# Patient Record
Sex: FEMALE | Race: AMERICAN INDIAN OR ALASKA NATIVE
[De-identification: names, ages, dates, MRNs, and addresses within clinical notes are randomized per-mention and may not be internally consistent; named-entity substitution may affect disease eponyms.]

---

## 2020-10-09 ENCOUNTER — HOSPITAL ENCOUNTER (EMERGENCY)
Dept: HOSPITAL 7 - FB.ED | Age: 20
Discharge: HOME | End: 2020-10-09
Payer: SELF-PAY

## 2020-10-09 DIAGNOSIS — Z79.899: ICD-10-CM

## 2020-10-09 DIAGNOSIS — F41.0: Primary | ICD-10-CM

## 2020-10-09 PROCEDURE — 99283 EMERGENCY DEPT VISIT LOW MDM: CPT

## 2020-10-09 PROCEDURE — 96372 THER/PROPH/DIAG INJ SC/IM: CPT

## 2020-10-09 NOTE — EDM.PDOCBH
ED HPI GENERAL MEDICAL PROBLEM





- General


Stated Complaint: SOB;CHEST PAIN


Time Seen by Provider: 10/09/20 20:55


Source of Information: Reports: Patient


History Limitations: Reports: No Limitations





- History of Present Illness


INITIAL COMMENTS - FREE TEXT/NARRATIVE: 





c/o panic attack





moved to local area 1w ago, not working





h/o panic attacks and anxiety, saw Ramon at [facility] [place] in past week and b

nolan on hydroxyzine 25 mg QID and Zoloft 25 mg/d, refered to counselor and has 

apt in 2w with Raad





lives with father who lives locally and with whom she recently reconciled as 

well as boyfriend and 1.4yo son, no one at home is ill





has been taking Zoloft, takes hydroxyzine usually 1/2 tab BID





no triggers for PAs





watching TV when she started, felt anxious and dizzy, head did not feel right 

altho she would discribe it as a HA, sxs had abated by time she arrived in ED, 

calm here altho head still did not feel quite right





did agree to Toradol 60 mg IM (for head dysphoria) and hydroxyzine 25 mg IM (for

anxiety)





in ED pt did not have sob/palpitations/f/c/d/n/v





- Related Data


                                    Allergies











Allergy/AdvReac Type Severity Reaction Status Date / Time


 


No Known Allergies Allergy   Verified 10/09/20 21:35














ED ROS GENERAL





- Review of Systems


Review Of Systems: See Below


Constitutional: Reports: No Symptoms


HEENT: Reports: No Symptoms


Respiratory: Reports: No Symptoms


Cardiovascular: Reports: No Symptoms


Endocrine: Reports: No Symptoms


GI/Abdominal: Reports: No Symptoms


: Reports: No Symptoms


Musculoskeletal: Reports: No Symptoms


Skin: Reports: No Symptoms


Neurological: Reports: No Symptoms


Psychiatric: Reports: Anxiety


Hematologic/Lymphatic: Reports: No Symptoms


Immunologic: Reports: No Symptoms





ED EXAM, BEHAVIORAL HEALTH





- Physical Exam


Exam: See Below


Exam Limited By: No Limitations


General Appearance: Alert, WD/WN, No Apparent Distress


Eye Exam: Bilateral Eye: EOMI, PERRL


Ears: Normal External Exam, Hearing Grossly Normal


Nose: Normal Inspection, Normal Mucosa, No Blood


Throat/Mouth: Normal Voice, No Airway Compromise


Head: Atraumatic, Normocephalic


Neck: Normal Inspection, Supple, Non-Tender, Full Range of Motion.  No: 

Lymphadenopathy (R), Lymphadenopathy (L)


Respiratory/Chest: No Respiratory Distress, Lungs Clear, Normal Breath Sounds, 

No Accessory Muscle Use, Chest Non-Tender


Cardiovascular: Regular Rate, Rhythm, No Edema, No Gallop, No JVD, No Murmur, No

Rub


GI/Abdominal: Soft, Non-Tender, No Distention


Back Exam: Normal Inspection, Full Range of Motion.  No: CVA Tenderness (R), CVA

Tenderness (L)


Extremities: Normal Inspection, Normal Range of Motion, Non-Tender, No Pedal 

Edema


Neurological: Alert, Normal Mood/Affect, CN II-XII Intact, Normal Cognition, No 

Motor/Sensory Deficits, Oriented x 3


Psychiatric: Alert, Normal Affect, Normal Cognition, Normal Mood, Oriented, 

Other (not visibly anxious here, cooperative, pleasant)


Skin Exam: Warm, Dry, Intact, Normal color, No rash





COURSE, BEHAVIORAL HEALTH COMP





- Course


Orders, Labs, Meds: 


Medications














Discontinued Medications














Generic Name Dose Route Start Last Admin





  Trade Name Bretq  PRN Reason Stop Dose Admin


 


Hydroxyzine HCl  25 mg  10/09/20 21:20  10/09/20 21:43





  Vistaril  IM  10/09/20 21:21  25 mg





  ONETIME ONE   Administration


 


Ketorolac Tromethamine  60 mg  10/09/20 21:20  10/09/20 21:44





  Toradol  IM  10/09/20 21:21  60 mg





  ONETIME ONE   Administration











Re-Assessment/Re-Exam: 





after meds here, pt still felt a little "woozy" in the head, no true pain





denied anxiety at time of d/c, was feeling better





wanted to get home to check on her son and to get some sleep





pt plans to continue Zoloft as scheduled including dose tonight





Departure





- Departure


Time of Disposition: 22:02


Disposition: Home, Self-Care 01


Condition: Good


Clinical Impression: 


 Panic attack








- Discharge Information


*PRESCRIPTION DRUG MONITORING PROGRAM REVIEWED*: Not Applicable


*COPY OF PRESCRIPTION DRUG MONITORING REPORT IN PATIENT CHADD: Not Applicable


Instructions:  Panic Attack, Living With Anxiety


Referrals: 


PCP,None [Primary Care Provider] - 


Additional Instructions: 


You are doing quite well.





Your exam and vital signs are normal.





Your heart and lungs are healthy.





Continue the Zoloft 25 mg 1 tab daily, as you are doing.





Continue the hydroxyzine 25 mg 1 tab 4 times a day.





Get adequate rest.





Eat 3 meals a day.





Maintain fluids.





See your counselor in 2 weeks as scheduled.





See Ramon at the [facility] in 2 weeks as scheduled.





Return to ED if you are feeling worse.

## 2021-04-01 ENCOUNTER — HOSPITAL ENCOUNTER (EMERGENCY)
Dept: HOSPITAL 7 - FB.ED | Age: 21
Discharge: HOME | End: 2021-04-01
Payer: SELF-PAY

## 2021-04-01 DIAGNOSIS — O99.891: Primary | ICD-10-CM

## 2021-04-01 DIAGNOSIS — R07.89: ICD-10-CM

## 2021-04-01 DIAGNOSIS — Z3A.27: ICD-10-CM

## 2021-04-01 NOTE — EDM.PDOC
ED HPI GENERAL MEDICAL PROBLEM





- General


Chief Complaint: General


Stated Complaint: CHEST PAIN


Time Seen by Provider: 21 01:35


Source of Information: Reports: Patient


History Limitations: Reports: No Limitations





- History of Present Illness


INITIAL COMMENTS - FREE TEXT/NARRATIVE: 





Patient presented to the ED because of bilateral shoulder /arm pain radiating 

towards the mid chest. The pain is sharp,3/10. there is no nausea/vomiting or 

dyspnea. She just want to be checjed because she is 27 wks age of gestation.





- Related Data


                                    Allergies











Allergy/AdvReac Type Severity Reaction Status Date / Time


 


No Known Allergies Allergy   Verified 21 02:04











Home Meds: 


                                    Home Meds





Pnv No.95/Ferrous Fum/Folic AC [Prenatal Caplet] 1 tab PO DAILY 21 

[History]











Past Medical History


OB/GYN History: Reports: Pregnancy


Other OB/GYN History: 


Psychiatric History: Reports: Anxiety, Depression





Social & Family History





- Tobacco Use


Tobacco Use Status *Q: Never Tobacco User





- Caffeine Use


Caffeine Use: Reports: Soda





- Recreational Drug Use


Recreational Drug Use: No





ED ROS GENERAL





- Review of Systems


Review Of Systems: See Below


Constitutional: Reports: No Symptoms


HEENT: Reports: No Symptoms


Respiratory: Reports: No Symptoms


Cardiovascular: Reports: Chest Pain


Endocrine: Reports: No Symptoms


GI/Abdominal: Reports: No Symptoms


: Reports: No Symptoms


Musculoskeletal: Reports: No Symptoms


Skin: Reports: No Symptoms


Neurological: Reports: No Symptoms


Psychiatric: Reports: No Symptoms


Hematologic/Lymphatic: Reports: No Symptoms





ED EXAM, GENERAL





- Physical Exam


Exam: See Below


Exam Limited By: No Limitations


General Appearance: Alert, No Apparent Distress


Ears: Normal External Exam, Normal Canal


Nose: Normal Inspection, Normal Mucosa, No Blood


Throat/Mouth: Normal Inspection, Normal Lips


Head: Atraumatic, Normocephalic


Neck: Normal Inspection, Supple, Non-Tender


Respiratory/Chest: No Respiratory Distress, Lungs Clear, Normal Breath Sounds


Cardiovascular: Normal Peripheral Pulses, Regular Rate, Rhythm, No Edema, No 

Gallop, No JVD, No Murmur


GI/Abdominal: Normal Bowel Sounds, Soft, Non-Tender, No Organomegaly, No 

Distention, No Abnormal Bruit


Back Exam: Normal Inspection, Full Range of Motion


Extremities: Normal Inspection, Normal Range of Motion, Non-Tender, No Pedal 

Edema, Normal Capillary Refill


Neurological: Alert, Oriented, CN II-XII Intact, Normal Cognition


Psychiatric: Normal Affect, Normal Mood


Skin Exam: Warm


  ** #1 Interpretation


EKG Date: 21


Time: 01:42


Rhythm: NSR


Rate (Beats/Min): 67


Axis: Normal


P-Wave: Present


QRS: Normal


ST-T: Normal


QT: Normal


Comparison: NA - No Prior EKG (NSR)





Course





- Vital Signs


Text/Narrative:: 





Labd/EKG result was reviewed and discussed with patient


Last Recorded V/S: 


                                Last Vital Signs











Temp  36.7 C   21 03:35


 


Pulse  75   21 03:35


 


Resp  16   21 03:35


 


BP  112/64   21 03:35


 


Pulse Ox  100   21 03:35














- Orders/Labs/Meds


Orders: 


                               Active Orders 24 hr











 Category Date Time Status


 


 EKG 12 Lead [EK] Routine Ther  21 01:42 Ordered











Labs: 


                                Laboratory Tests











  21 Range/Units





  02:00 02:00 02:10 


 


WBC  9.1    (3.0-10.3)  x10-3/uL


 


RBC  3.62    (3.60-5.20)  x10(6)uL


 


Hgb  10.7 L    (11.4-15.5)  g/dL


 


Hct  31.7 L    (34.2-48.2)  %


 


MCV  87.4    (76.7-100.5)  fL


 


MCH  29.6    (23.9-33.9)  pg


 


MCHC  33.8    (31.9-34.8)  g/dL


 


RDW  12.3    (12.3-16.5)  %


 


Plt Count  206    (151-488)  x10(3)uL


 


MPV  9.2    (7.1-12.4)  fL


 


Neut % (Auto)  72.5    (30.8-76.2)  %


 


Lymph % (Auto)  20.9    (18.4-52.1)  %


 


Mono % (Auto)  5.5    (4.4-15.7)  %


 


Eos % (Auto)  0.9    (0.6-8.1)  %


 


Baso % (Auto)  0.2    (0.2-1.5)  %


 


Neut # (Auto)  6.6 H    (1.5-6.3)  x10-3/uL


 


Lymph # (Auto)  1.9    (1.0-4.4)  x10-3/uL


 


Mono # (Auto)  0.5    (0.3-1.0)  x10-3/uL


 


Eos # (Auto)  0.1    (0.0-0.8)  x10-3/uL


 


Baso # (Auto)  0.0    (0.0-0.1)  x10-3/uL


 


D-Dimer, Quantitative    0.53  (0.0-0.59)  mg/LFEU


 


Sodium   138   (135-145)  mmol/L


 


Potassium   3.5   (3.5-5.3)  mmol/L


 


Chloride   102   (100-110)  mmol/L


 


Carbon Dioxide   24   (21-32)  mmol/L


 


BUN   3 L   (7-18)  mg/dL


 


Creatinine   0.5 L   (0.55-1.02)  mg/dL


 


Est Cr Clr Drug Dosing   133.18   mL/min


 


Estimated GFR (MDRD)   > 60   (>60)  


 


BUN/Creatinine Ratio   6.0 L   (9-20)  


 


Glucose   88   ()  mg/dL


 


Calcium   8.7   (8.6-10.2)  mg/dL


 


Troponin I     (4.0-60.3)  pg/mL














  21 Range/Units





  02:10 


 


WBC   (3.0-10.3)  x10-3/uL


 


RBC   (3.60-5.20)  x10(6)uL


 


Hgb   (11.4-15.5)  g/dL


 


Hct   (34.2-48.2)  %


 


MCV   (76.7-100.5)  fL


 


MCH   (23.9-33.9)  pg


 


MCHC   (31.9-34.8)  g/dL


 


RDW   (12.3-16.5)  %


 


Plt Count   (151-488)  x10(3)uL


 


MPV   (7.1-12.4)  fL


 


Neut % (Auto)   (30.8-76.2)  %


 


Lymph % (Auto)   (18.4-52.1)  %


 


Mono % (Auto)   (4.4-15.7)  %


 


Eos % (Auto)   (0.6-8.1)  %


 


Baso % (Auto)   (0.2-1.5)  %


 


Neut # (Auto)   (1.5-6.3)  x10-3/uL


 


Lymph # (Auto)   (1.0-4.4)  x10-3/uL


 


Mono # (Auto)   (0.3-1.0)  x10-3/uL


 


Eos # (Auto)   (0.0-0.8)  x10-3/uL


 


Baso # (Auto)   (0.0-0.1)  x10-3/uL


 


D-Dimer, Quantitative   (0.0-0.59)  mg/LFEU


 


Sodium   (135-145)  mmol/L


 


Potassium   (3.5-5.3)  mmol/L


 


Chloride   (100-110)  mmol/L


 


Carbon Dioxide   (21-32)  mmol/L


 


BUN   (7-18)  mg/dL


 


Creatinine   (0.55-1.02)  mg/dL


 


Est Cr Clr Drug Dosing   mL/min


 


Estimated GFR (MDRD)   (>60)  


 


BUN/Creatinine Ratio   (9-20)  


 


Glucose   ()  mg/dL


 


Calcium   (8.6-10.2)  mg/dL


 


Troponin I  < 4.0 L  (4.0-60.3)  pg/mL














Departure





- Departure


Time of Disposition: 15:35


Disposition: Home, Self-Care 01


Condition: Good


Clinical Impression: 


 Atypical chest pain








- Discharge Information


Instructions:  Nonspecific Chest Pain, Adult, Easy-to-Read


Referrals: 


Jorge Estevez MD [Primary Care Provider] - 


Forms:  ED Department Discharge


Additional Instructions: 


Please read discharge instructions on atypical chest pain


Take tylenol 1000 mg every 8 hours as needed for pain


Follow up as needed





Sepsis Event Note (ED)





- Evaluation


Sepsis Screening Result: No Definite Risk





- Focused Exam


Vital Signs: 


                                   Vital Signs











  Temp Pulse Resp BP Pulse Ox


 


 21 03:35  36.7 C  75  16  112/64  100


 


 21 01:35  36.6 C  72  16  120/72  100














- My Orders


Last 24 Hours: 


My Active Orders





21 01:42


EKG 12 Lead [EK] Routine 














- Assessment/Plan


Last 24 Hours: 


My Active Orders





21 01:42


EKG 12 Lead [EK] Routine

## 2021-04-02 ENCOUNTER — HOSPITAL ENCOUNTER (EMERGENCY)
Dept: HOSPITAL 7 - FB.ED | Age: 21
Discharge: HOME | End: 2021-04-02
Payer: SELF-PAY

## 2021-04-02 DIAGNOSIS — F41.0: ICD-10-CM

## 2021-04-02 DIAGNOSIS — Z3A.27: ICD-10-CM

## 2021-04-02 DIAGNOSIS — O99.342: ICD-10-CM

## 2021-04-02 DIAGNOSIS — O99.612: Primary | ICD-10-CM

## 2021-04-02 DIAGNOSIS — K21.9: ICD-10-CM

## 2021-04-02 NOTE — EDM.PDOC
ED HPI GENERAL MEDICAL PROBLEM





- General


Chief Complaint: General


Stated Complaint: panic attack


Time Seen by Provider: 21 04:25


Source of Information: Reports: Patient


History Limitations: Reports: No Limitations





- History of Present Illness


INITIAL COMMENTS - FREE TEXT/NARRATIVE: 





c/o epigastric burning





 at 27w gestation, went to bed at 2:30a, had burning in her epigastrium, 

heart began to race, felt SOB, had dryness in back of throat





has h/o panic attacks, pt thought a panic attack had developed after her other 

sxs





has felt otherwise, baby has been very active, baby was kicking her at the time 

of her sxs





pt was here last night for same sxs, w/u last night neg





has not had h/o GERD in past altho came in by EMS tonight who suggested GERD





Dr Estevez is her PCP and following her preg





MEDS: only PNV BID





OB: no SAB/EAB, had 12h labor at 41w gestation and delivered in [facility] for 

1st preg





SH: lives with riend and 3 yo child, has not had COVID vax


  ** Epigastric


Pain Score (Numeric/FACES): 2





- Related Data


                                    Allergies











Allergy/AdvReac Type Severity Reaction Status Date / Time


 


No Known Allergies Allergy   Verified 21 04:27











Home Meds: 


                                    Home Meds





Pnv No.95/Ferrous Fum/Folic AC [Prenatal Caplet] 1 tab PO DAILY 21 

[History]











Past Medical History


OB/GYN History: Reports: Pregnancy


Other OB/GYN History: , currently 27 weeks gestation


Psychiatric History: Reports: Anxiety, Depression





Social & Family History





- Tobacco Use


Tobacco Use Status *Q: Never Tobacco User





- Caffeine Use


Caffeine Use: Reports: None





- Recreational Drug Use


Recreational Drug Use: No





ED ROS GENERAL





- Review of Systems


Review Of Systems: See Below


Constitutional: Reports: No Symptoms


HEENT: Reports: No Symptoms


Respiratory: Reports: Shortness of Breath


Cardiovascular: Reports: No Symptoms


Endocrine: Reports: No Symptoms


GI/Abdominal: Reports: Other (epigastric and substernal burning)


: Reports: No Symptoms


Musculoskeletal: Reports: No Symptoms


Skin: Reports: No Symptoms


Neurological: Reports: No Symptoms


Psychiatric: Reports: Anxiety


Hematologic/Lymphatic: Reports: No Symptoms


Immunologic: Reports: No Symptoms





ED EXAM, GENERAL





- Physical Exam


Exam: See Below


Exam Limited By: No Limitations


General Appearance: Alert, WD/WN, Other (mildly anxious)


Head: Atraumatic, Normocephalic


Neck: Normal Inspection, Supple, Non-Tender, Full Range of Motion.  No: 

Lymphadenopathy (R), Lymphadenopathy (L)


Respiratory/Chest: No Respiratory Distress, Lungs Clear, Normal Breath Sounds, 

No Accessory Muscle Use, Chest Non-Tender


Cardiovascular: Regular Rate, Rhythm, No Edema, No Gallop, No JVD, No Murmur, No

 Rub


GI/Abdominal: Normal Bowel Sounds, Soft, Other (gravid, mild tender epigstrium 

only, active kicks palpable, )


Back Exam: Normal Inspection, Full Range of Motion


Extremities: Normal Inspection, Normal Range of Motion, Non-Tender, No Pedal 

Edema, Other (no pretib edema)


Neurological: Alert, Oriented, CN II-XII Intact, Normal Cognition, Normal Gait, 

No Motor/Sensory Deficits


Psychiatric: Anxious


Skin Exam: Warm, Dry, Intact, Normal Color, No Rash


Lymphatic: No Adenopathy





Course





- Vital Signs


Last Recorded V/S: 





                                Last Vital Signs











Temp  36.8 C   21 04:16


 


Pulse  100   21 04:16


 


Resp  20   21 04:16


 


BP  120/60   21 04:16


 


Pulse Ox  99   21 04:16














- Orders/Labs/Meds


Orders: 





                               Active Orders 24 hr











 Category Date Time Status


 


 Alum Hydroxide/Mag Hydroxide [Mag-Al Susp] Med  21 04:36 Once





 30 ml PO NOW ONE   








                                Medication Orders





Al Hydroxide/Mg Hydroxide (Aluminum Hydroxide/Magnesium Hydroxide Susp 30 Ml 

Cup)  30 ml PO NOW ONE


   Stop: 21 04:37








Meds: 





Medications











Generic Name Dose Route Start Last Admin





  Trade Name Freq  PRN Reason Stop Dose Admin


 


Al Hydroxide/Mg Hydroxide  30 ml  21 04:36 





  Aluminum Hydroxide/Magnesium Hydroxide Susp 30 Ml Cup  PO  21 04:37 





  NOW ONE  














- Re-Assessments/Exams


Free Text/Narrative Re-Assessment/Exam: 





21 05:15


doing well with preg, pt reports wt gain 119 to 135





long discussion re GERD and PA





pt not working, lives with bfriend and 3 yo, overall appears to be doing well 

with preg





signs and sxs typical of GERD, did not have GERD with 1st preg





all questions answered





Departure





- Departure


Time of Disposition: 05:07


Disposition: Home, Self-Care 01


Condition: Good


Clinical Impression: 


 GERD (gastroesophageal reflux disease), Panic attack








- Discharge Information


*PRESCRIPTION DRUG MONITORING PROGRAM REVIEWED*: Not Applicable


*COPY OF PRESCRIPTION DRUG MONITORING REPORT IN PATIENT CHADD: Not Applicable


Instructions:  Gastroesophageal Reflux Disease, Adult, Panic Attack


Referrals: 


PCP,None [Primary Care Provider] - 


Additional Instructions: 


The baby is doing well.





Take liquid antacids 15 ml (one tablespoon) every 4 hours as needed. May take 

when you go to bed. If you keep the bottle by the bedside, you can take a dose 

if you wake up during the night.





Sometimes acid blocking medications such as ranitidine or omeprazole are used 

during the 3rd trimester, which you can discuss with Dr Estevez as needed.





See Dr Estevez next week on  as scheduled.











Sepsis Event Note (ED)





- Evaluation


Sepsis Screening Result: No Definite Risk





- Focused Exam


Vital Signs: 





                                   Vital Signs











  Temp Pulse Resp BP Pulse Ox


 


 21 04:16  36.8 C  100  20  120/60  99














- My Orders


Last 24 Hours: 





My Active Orders





21 04:36


Alum Hydroxide/Mag Hydroxide [Mag-Al Susp]   30 ml PO NOW ONE 














- Assessment/Plan


Last 24 Hours: 





My Active Orders





21 04:36


Alum Hydroxide/Mag Hydroxide [Mag-Al Susp]   30 ml PO NOW ONE